# Patient Record
Sex: FEMALE | Race: WHITE | Employment: FULL TIME | URBAN - METROPOLITAN AREA
[De-identification: names, ages, dates, MRNs, and addresses within clinical notes are randomized per-mention and may not be internally consistent; named-entity substitution may affect disease eponyms.]

---

## 2017-04-29 ENCOUNTER — HOSPITAL ENCOUNTER (OUTPATIENT)
Age: 27
Discharge: HOME | End: 2017-04-29
Attending: FAMILY MEDICINE
Payer: COMMERCIAL

## 2017-04-29 VITALS
DIASTOLIC BLOOD PRESSURE: 90 MMHG | SYSTOLIC BLOOD PRESSURE: 141 MMHG | HEIGHT: 64 IN | TEMPERATURE: 98 F | BODY MASS INDEX: 22.2 KG/M2 | RESPIRATION RATE: 19 BRPM | HEART RATE: 97 BPM | WEIGHT: 130 LBS | OXYGEN SATURATION: 100 %

## 2017-04-29 DIAGNOSIS — J06.9 VIRAL UPPER RESPIRATORY TRACT INFECTION: Primary | ICD-10-CM

## 2017-04-29 PROCEDURE — 99202 OFFICE O/P NEW SF 15 MIN: CPT

## 2017-04-29 PROCEDURE — 99203 OFFICE O/P NEW LOW 30 MIN: CPT

## 2017-04-29 PROCEDURE — 99205 OFFICE O/P NEW HI 60 MIN: CPT

## 2017-04-29 PROCEDURE — 99204 OFFICE O/P NEW MOD 45 MIN: CPT

## 2017-04-29 PROCEDURE — 99201 PR OUTPT EVAL AND MGNT NEW PT LEVEL 1: CPT

## 2017-04-29 RX ORDER — BENZONATATE 200 MG/1
200 CAPSULE ORAL 3 TIMES DAILY PRN
Qty: 30 CAPSULE | Refills: 0 | Status: SHIPPED | OUTPATIENT
Start: 2017-04-29

## 2017-04-29 RX ORDER — CODEINE PHOSPHATE AND GUAIFENESIN 10; 100 MG/5ML; MG/5ML
5 SOLUTION ORAL NIGHTLY PRN
Qty: 60 ML | Refills: 0 | Status: SHIPPED | OUTPATIENT
Start: 2017-04-29

## 2017-04-29 NOTE — ED INITIAL ASSESSMENT (HPI)
Pt presents to the IC with c/o cough for the last 1-2 weeks. Pt notes the symptoms are not improving. AOx4. Denies fevers. Pt notes nasal congestion. No sore throat or ear pain. Pt only notes head pain when coughing, otherwise denies all pain.  Pt has taken

## 2017-04-29 NOTE — ED PROVIDER NOTES
Patient Seen in: [address]    History   Patient presents with:  Cough/URI    Stated Complaint: cough    Patient is a 23year old female presenting with URI. The history is provided by the patient.    URI  The primary sym 04/29/17 1100 97   Resp 04/29/17 1100 19   Temp 04/29/17 1100 98 °F (36.7 °C)   Temp src 04/29/17 1100 Oral   SpO2 04/29/17 1100 100 %   O2 Device 04/29/17 1100 None (Room air)       Current:/90 mmHg  Pulse 97  Temp(Src) 98 °F (36.7 °C) (Oral)  Resp 0

## 2022-11-02 ENCOUNTER — HOSPITAL ENCOUNTER (OUTPATIENT)
Dept: GENERAL RADIOLOGY | Age: 32
Discharge: HOME | End: 2022-11-02
Attending: STUDENT IN AN ORGANIZED HEALTH CARE EDUCATION/TRAINING PROGRAM

## 2022-11-02 ENCOUNTER — TELEPHONE (OUTPATIENT)
Dept: SCHEDULING | Age: 32
End: 2022-11-02

## 2022-11-02 DIAGNOSIS — J18.9 PNEUMONIA: ICD-10-CM

## 2022-11-02 PROCEDURE — 71046 X-RAY EXAM CHEST 2 VIEWS: CPT

## (undated) NOTE — ED AVS SNAPSHOT
[facility] AND [facility] in [place] [address]    Phone:  [phone]    Fax:  [phone]           Ms. Aba Lauren   MRN: [id number]    Department:  [facility] AND [facility] in [facility]   Date of Visit: physician may seek payment directly from you for amounts other than your deductible, co-payment, or co-insurance and for other services not covered under your health insurance plan.   Please contact your insurance company and physician's office to determine different from what your Primary Care doctor has instructed you - please continue to take your medications as instructed by your Primary Care doctor until you can check with your doctor. Please bring the medication list to your next doctor's appointment. coverage. Patient [address] is a [employer] program that can help with your Affordable Care Act coverage, as well as all types of Medicaid plans.   To get signed up and covered, please call [phone] and ask to get set up for an insuran